# Patient Record
Sex: FEMALE
[De-identification: names, ages, dates, MRNs, and addresses within clinical notes are randomized per-mention and may not be internally consistent; named-entity substitution may affect disease eponyms.]

---

## 2023-02-28 PROBLEM — Z00.00 ENCOUNTER FOR PREVENTIVE HEALTH EXAMINATION: Status: ACTIVE | Noted: 2023-02-28

## 2023-03-06 ENCOUNTER — APPOINTMENT (OUTPATIENT)
Dept: HEPATOLOGY | Facility: CLINIC | Age: 43
End: 2023-03-06
Payer: MEDICAID

## 2023-03-06 VITALS
RESPIRATION RATE: 18 BRPM | SYSTOLIC BLOOD PRESSURE: 117 MMHG | HEART RATE: 73 BPM | DIASTOLIC BLOOD PRESSURE: 79 MMHG | OXYGEN SATURATION: 97 % | TEMPERATURE: 98.3 F

## 2023-03-06 DIAGNOSIS — R13.10 DYSPHAGIA, UNSPECIFIED: ICD-10-CM

## 2023-03-06 DIAGNOSIS — Z12.11 ENCOUNTER FOR SCREENING FOR MALIGNANT NEOPLASM OF COLON: ICD-10-CM

## 2023-03-06 DIAGNOSIS — K59.00 CONSTIPATION, UNSPECIFIED: ICD-10-CM

## 2023-03-06 DIAGNOSIS — R74.8 ABNORMAL LEVELS OF OTHER SERUM ENZYMES: ICD-10-CM

## 2023-03-06 PROCEDURE — 99204 OFFICE O/P NEW MOD 45 MIN: CPT

## 2023-03-06 RX ORDER — POLYETHYLENE GLYCOL 3350 17 G/17G
17 POWDER, FOR SOLUTION ORAL
Qty: 1 | Refills: 0 | Status: ACTIVE | COMMUNITY
Start: 2023-03-06 | End: 1900-01-01

## 2023-03-06 NOTE — END OF VISIT
[] : Fellow [FreeTextEntry3] : Attending note\par Patient is referred for evaluation of dysphagia.  She has seen a gastroenterologist in 2021 and endoscopy was unremarkable.  Biopsy of the esophagus did not show eosinophilic esophagitis.  Biopsy of the stomach showed mild chronic gastritis but no H. pylori infection.  Her symptoms are remarkable for signs of dysphagia only with spicy food.  No history of food impaction.  Occasional bloating.  No rectal bleeding.  No change of bowel habits.\par Physical exam is mostly unremarkable\par Impression /suggestion:\par  patient with elevated liver enzymes which could be related to fatty liver disease.  We will repeat the labs including a set of hepatitis panel.\par Sonogram did not show any evidence of advanced fibrosis\par Patient's dysphagia has been evaluated by her gastroenterologist.  She did not follow-up with the GI and would like us to take over her care.  Her father was my patient with Jason cancer.  Accordingly, I recommended her to have a barium swallow and consider colonoscopy as well as upper endoscopy.  We also offered her to have the procedure done by female physician if there is a cultural barrier.\par Role of diet relaxation and use of famotidine plus as needed Mylanta were discussed with her in details.\par Follow-up in 3 to 4 months\par \par 3 for trouble originally and they did not want to approve him because he was having a fight with his memory or his wife over 2500 hours and a regular program of his treatment at 1 at the intake meeting be criticism was he had no place to live so I have a little\par

## 2023-03-06 NOTE — ASSESSMENT
[FreeTextEntry1] : 42F PMHx HTN, refux, carotid aneurysm, VARSHA preseenting to clinic today with complaints of ongoing dysphagia. \par \par #Dysphagia\par -EGD at [facility], R/B/A/L discussed and reviewed with patient, all questions answered\par -CBC, CMP today\par \par #Constipation\par #VARSHA\par #CRC screening\par #Family history of CRC\par Notes new constipation in the setting of Fe supplementation however still persistent even after discontinuation of Fe supplementation. Notes history of hemorrhoids, being more bothersome in the setting of constipation. 4-5 lb weight loss over the last year. \par \par #At risk for Hepatitis B/C\par -Hepatitis B/C serologies today\par \par Cassie Philip DO\par Gastroenterology Fellow\par

## 2023-03-06 NOTE — REVIEW OF SYSTEMS
[As Noted in HPI] : as noted in HPI [Constipation] : constipation [Heartburn] : heartburn [Negative] : Heme/Lymph [FreeTextEntry7] : dysphagia

## 2023-03-06 NOTE — HISTORY OF PRESENT ILLNESS
[de-identified] : HPI- 42F PMHx HTN, refux, carotid aneurysm, VARSHA preseenting to clinic today with complaints of ongoing dysphagia. \par \par >1 yr of dysphagia with associated epigastric pain/burning, early satiety. Tried Omeprazole and chewable simethicone previously, no relief. Currently taking Famotidine 40 mg qHS and Mylanta TID which currently controls her current symptoms. Notes recurrence of sxs when she eats spicy foods. \par \par Did H pylori breath test 6 months ago, negative however was taking Omeprazole then. \par \par Reflux symptoms mainly at night, daily prior to >1 month. \par +weight loss (4-5 lbs over 1 yr), sometimes notes voice hoarseness\par no odynophagia\par \par Notes to have an issue of epigastric burning as a kid, took zantac which provided relief.\par \par EGD (2021): gastritis (bx), normal esophagus and duodenum\par \par Path: \par -Duodenum (2nd & 3rd portion) - nl\par -Duodenal bulb - mild chronic inflammation\par -Stomach antrum - negative h pylori, no IM, no dysplasia, mild chronic gastritis\par -Stomach body - negative h pylori, no IM, no dysplasia, mild chronic gastritis\par -Stomach fundus - negative h pylori, no IM, no dysplasia, normal mucosa\par -GEJ -  no EoE\par -middle esophagus - normal, no EoE\par -upper esophagus - normal, no EoE\par \par PMHx -HTN, refux, carotid aneurysm\par PSHx -  x2 \par Rx - Famotidine 40 mg, Mylanta TID, Losartan 25 mg daily, diclofenac 25 daily, FLuticasone\par Supplements/herbs/OTC - Vitamin D 50,000, Fe supplements\par A/C or NSAIDs? - Diclofenac \par FMHx - Father - colon cancer (age 62), no other GI related malignancy, no liver issues\par Allergies -seasonal; NKDA\par EtOH - denies\par Smoking - never smoker\par Drugs - denies\par Diet - avoids spicy foods \par \par Breath Tests - \par Imaging - ABd US () normal \par EGD - 2021 - gastritis, normal esophagus, duodenum\par Colonoscopy - No prior\par \par

## 2023-03-07 ENCOUNTER — NON-APPOINTMENT (OUTPATIENT)
Age: 43
End: 2023-03-07

## 2023-03-07 LAB
ALBUMIN SERPL ELPH-MCNC: 4 G/DL
ALP BLD-CCNC: 77 U/L
ALT SERPL-CCNC: 16 U/L
ANION GAP SERPL CALC-SCNC: 13 MMOL/L
AST SERPL-CCNC: 16 U/L
BASOPHILS # BLD AUTO: 0.07 K/UL
BASOPHILS NFR BLD AUTO: 1.1 %
BILIRUB SERPL-MCNC: 0.5 MG/DL
BUN SERPL-MCNC: 9 MG/DL
CALCIUM SERPL-MCNC: 9.7 MG/DL
CHLORIDE SERPL-SCNC: 105 MMOL/L
CO2 SERPL-SCNC: 21 MMOL/L
CREAT SERPL-MCNC: 0.72 MG/DL
EGFR: 106 ML/MIN/1.73M2
EOSINOPHIL # BLD AUTO: 0.38 K/UL
EOSINOPHIL NFR BLD AUTO: 6.1 %
GLUCOSE SERPL-MCNC: 86 MG/DL
HCT VFR BLD CALC: 36.9 %
HGB BLD-MCNC: 11.6 G/DL
IMM GRANULOCYTES NFR BLD AUTO: 0.2 %
LYMPHOCYTES # BLD AUTO: 2.16 K/UL
LYMPHOCYTES NFR BLD AUTO: 34.4 %
MAN DIFF?: NORMAL
MCHC RBC-ENTMCNC: 26.1 PG
MCHC RBC-ENTMCNC: 31.4 GM/DL
MCV RBC AUTO: 83.1 FL
MONOCYTES # BLD AUTO: 0.52 K/UL
MONOCYTES NFR BLD AUTO: 8.3 %
NEUTROPHILS # BLD AUTO: 3.14 K/UL
NEUTROPHILS NFR BLD AUTO: 49.9 %
PLATELET # BLD AUTO: 290 K/UL
POTASSIUM SERPL-SCNC: 4.4 MMOL/L
PROT SERPL-MCNC: 7.1 G/DL
RBC # BLD: 4.44 M/UL
RBC # FLD: 13.3 %
SODIUM SERPL-SCNC: 139 MMOL/L
WBC # FLD AUTO: 6.28 K/UL

## 2023-03-09 ENCOUNTER — NON-APPOINTMENT (OUTPATIENT)
Age: 43
End: 2023-03-09

## 2023-03-09 LAB
HBV CORE IGG+IGM SER QL: NONREACTIVE
HBV SURFACE AB SER QL: REACTIVE
HBV SURFACE AG SER QL: NONREACTIVE
HCV AB SER QL: NONREACTIVE
HCV S/CO RATIO: 0.07 S/CO

## 2023-05-01 ENCOUNTER — APPOINTMENT (OUTPATIENT)
Dept: HEPATOLOGY | Facility: CLINIC | Age: 43
End: 2023-05-01